# Patient Record
Sex: FEMALE | Race: ASIAN | NOT HISPANIC OR LATINO | URBAN - METROPOLITAN AREA
[De-identification: names, ages, dates, MRNs, and addresses within clinical notes are randomized per-mention and may not be internally consistent; named-entity substitution may affect disease eponyms.]

---

## 2017-01-11 ENCOUNTER — INPATIENT (INPATIENT)
Facility: HOSPITAL | Age: 22
LOS: 1 days | Discharge: HOME | DRG: 343 | End: 2017-01-13
Attending: SPECIALIST | Admitting: SPECIALIST
Payer: COMMERCIAL

## 2017-01-11 VITALS
SYSTOLIC BLOOD PRESSURE: 122 MMHG | WEIGHT: 145.06 LBS | HEIGHT: 66 IN | DIASTOLIC BLOOD PRESSURE: 74 MMHG | RESPIRATION RATE: 19 BRPM | HEART RATE: 116 BPM | TEMPERATURE: 99 F | OXYGEN SATURATION: 98 %

## 2017-01-11 DIAGNOSIS — K35.2 ACUTE APPENDICITIS WITH GENERALIZED PERITONITIS: ICD-10-CM

## 2017-01-11 DIAGNOSIS — K37 UNSPECIFIED APPENDICITIS: ICD-10-CM

## 2017-01-11 LAB
ALBUMIN SERPL ELPH-MCNC: 3.9 G/DL — SIGNIFICANT CHANGE UP (ref 3.5–5)
ALP SERPL-CCNC: 61 U/L — SIGNIFICANT CHANGE UP (ref 40–120)
ALT FLD-CCNC: 16 U/L DA — SIGNIFICANT CHANGE UP (ref 10–60)
ANION GAP SERPL CALC-SCNC: 10 MMOL/L — SIGNIFICANT CHANGE UP (ref 5–17)
APPEARANCE UR: CLEAR — SIGNIFICANT CHANGE UP
APTT BLD: 32.1 SEC — SIGNIFICANT CHANGE UP (ref 27.5–37.4)
AST SERPL-CCNC: 11 U/L — SIGNIFICANT CHANGE UP (ref 10–40)
BASOPHILS # BLD AUTO: 0 K/UL — SIGNIFICANT CHANGE UP (ref 0–0.2)
BASOPHILS NFR BLD AUTO: 0.3 % — SIGNIFICANT CHANGE UP (ref 0–2)
BILIRUB SERPL-MCNC: 0.5 MG/DL — SIGNIFICANT CHANGE UP (ref 0.2–1.2)
BILIRUB UR-MCNC: NEGATIVE — SIGNIFICANT CHANGE UP
BUN SERPL-MCNC: 8 MG/DL — SIGNIFICANT CHANGE UP (ref 7–18)
CALCIUM SERPL-MCNC: 9.4 MG/DL — SIGNIFICANT CHANGE UP (ref 8.4–10.5)
CHLORIDE SERPL-SCNC: 104 MMOL/L — SIGNIFICANT CHANGE UP (ref 96–108)
CO2 SERPL-SCNC: 25 MMOL/L — SIGNIFICANT CHANGE UP (ref 22–31)
COLOR SPEC: YELLOW — SIGNIFICANT CHANGE UP
CREAT SERPL-MCNC: 0.78 MG/DL — SIGNIFICANT CHANGE UP (ref 0.5–1.3)
DIFF PNL FLD: ABNORMAL
EOSINOPHIL # BLD AUTO: 0 K/UL — SIGNIFICANT CHANGE UP (ref 0–0.5)
EOSINOPHIL NFR BLD AUTO: 0 % — SIGNIFICANT CHANGE UP (ref 0–6)
GLUCOSE SERPL-MCNC: 127 MG/DL — HIGH (ref 70–99)
GLUCOSE UR QL: NEGATIVE — SIGNIFICANT CHANGE UP
HCG UR QL: NEGATIVE — SIGNIFICANT CHANGE UP
HCT VFR BLD CALC: 42 % — SIGNIFICANT CHANGE UP (ref 34.5–45)
HGB BLD-MCNC: 14.1 G/DL — SIGNIFICANT CHANGE UP (ref 11.5–15.5)
INR BLD: 1.19 RATIO — HIGH (ref 0.88–1.16)
KETONES UR-MCNC: ABNORMAL
LACTATE SERPL-SCNC: 0.9 MMOL/L — SIGNIFICANT CHANGE UP (ref 0.7–2)
LEUKOCYTE ESTERASE UR-ACNC: ABNORMAL
LIDOCAIN IGE QN: 103 U/L — SIGNIFICANT CHANGE UP (ref 73–393)
LYMPHOCYTES # BLD AUTO: 0.9 K/UL — LOW (ref 1–3.3)
LYMPHOCYTES # BLD AUTO: 6.1 % — LOW (ref 13–44)
MCHC RBC-ENTMCNC: 28.2 PG — SIGNIFICANT CHANGE UP (ref 27–34)
MCHC RBC-ENTMCNC: 33.5 GM/DL — SIGNIFICANT CHANGE UP (ref 32–36)
MCV RBC AUTO: 84.2 FL — SIGNIFICANT CHANGE UP (ref 80–100)
MONOCYTES # BLD AUTO: 0.3 K/UL — SIGNIFICANT CHANGE UP (ref 0–0.9)
MONOCYTES NFR BLD AUTO: 2 % — SIGNIFICANT CHANGE UP (ref 2–14)
NEUTROPHILS # BLD AUTO: 14.2 K/UL — HIGH (ref 1.8–7.4)
NEUTROPHILS NFR BLD AUTO: 91.6 % — HIGH (ref 43–77)
NITRITE UR-MCNC: NEGATIVE — SIGNIFICANT CHANGE UP
PH UR: 6 — SIGNIFICANT CHANGE UP (ref 4.8–8)
PLATELET # BLD AUTO: 223 K/UL — SIGNIFICANT CHANGE UP (ref 150–400)
POTASSIUM SERPL-MCNC: 3.6 MMOL/L — SIGNIFICANT CHANGE UP (ref 3.5–5.3)
POTASSIUM SERPL-SCNC: 3.6 MMOL/L — SIGNIFICANT CHANGE UP (ref 3.5–5.3)
PROT SERPL-MCNC: 8.6 G/DL — HIGH (ref 6–8.3)
PROT UR-MCNC: 15
PROTHROM AB SERPL-ACNC: 13.4 SEC — HIGH (ref 10–13.1)
RBC # BLD: 4.99 M/UL — SIGNIFICANT CHANGE UP (ref 3.8–5.2)
RBC # FLD: 12 % — SIGNIFICANT CHANGE UP (ref 10.3–14.5)
SODIUM SERPL-SCNC: 139 MMOL/L — SIGNIFICANT CHANGE UP (ref 135–145)
SP GR SPEC: 1.03 — HIGH (ref 1.01–1.02)
UROBILINOGEN FLD QL: NEGATIVE — SIGNIFICANT CHANGE UP
WBC # BLD: 15.5 K/UL — HIGH (ref 3.8–10.5)
WBC # FLD AUTO: 15.5 K/UL — HIGH (ref 3.8–10.5)

## 2017-01-11 PROCEDURE — 99285 EMERGENCY DEPT VISIT HI MDM: CPT | Mod: 25

## 2017-01-11 PROCEDURE — 88304 TISSUE EXAM BY PATHOLOGIST: CPT | Mod: 26

## 2017-01-11 PROCEDURE — 74177 CT ABD & PELVIS W/CONTRAST: CPT | Mod: 26

## 2017-01-11 PROCEDURE — 44970 LAPAROSCOPY APPENDECTOMY: CPT | Mod: AS

## 2017-01-11 RX ORDER — SODIUM CHLORIDE 9 MG/ML
1000 INJECTION INTRAMUSCULAR; INTRAVENOUS; SUBCUTANEOUS ONCE
Qty: 0 | Refills: 0 | Status: COMPLETED | OUTPATIENT
Start: 2017-01-11 | End: 2017-01-11

## 2017-01-11 RX ORDER — SODIUM CHLORIDE 9 MG/ML
1000 INJECTION, SOLUTION INTRAVENOUS
Qty: 0 | Refills: 0 | Status: DISCONTINUED | OUTPATIENT
Start: 2017-01-11 | End: 2017-01-11

## 2017-01-11 RX ORDER — MORPHINE SULFATE 50 MG/1
4 CAPSULE, EXTENDED RELEASE ORAL EVERY 4 HOURS
Qty: 0 | Refills: 0 | Status: DISCONTINUED | OUTPATIENT
Start: 2017-01-11 | End: 2017-01-13

## 2017-01-11 RX ORDER — METOCLOPRAMIDE HCL 10 MG
10 TABLET ORAL ONCE
Qty: 0 | Refills: 0 | Status: COMPLETED | OUTPATIENT
Start: 2017-01-11 | End: 2017-01-11

## 2017-01-11 RX ORDER — ENOXAPARIN SODIUM 100 MG/ML
40 INJECTION SUBCUTANEOUS EVERY 24 HOURS
Qty: 0 | Refills: 0 | Status: DISCONTINUED | OUTPATIENT
Start: 2017-01-11 | End: 2017-01-13

## 2017-01-11 RX ORDER — HYDROMORPHONE HYDROCHLORIDE 2 MG/ML
0.5 INJECTION INTRAMUSCULAR; INTRAVENOUS; SUBCUTANEOUS
Qty: 0 | Refills: 0 | Status: DISCONTINUED | OUTPATIENT
Start: 2017-01-11 | End: 2017-01-11

## 2017-01-11 RX ORDER — ONDANSETRON 8 MG/1
4 TABLET, FILM COATED ORAL EVERY 6 HOURS
Qty: 0 | Refills: 0 | Status: DISCONTINUED | OUTPATIENT
Start: 2017-01-11 | End: 2017-01-11

## 2017-01-11 RX ORDER — ENOXAPARIN SODIUM 100 MG/ML
40 INJECTION SUBCUTANEOUS EVERY 24 HOURS
Qty: 0 | Refills: 0 | Status: DISCONTINUED | OUTPATIENT
Start: 2017-01-11 | End: 2017-01-11

## 2017-01-11 RX ORDER — ONDANSETRON 8 MG/1
4 TABLET, FILM COATED ORAL ONCE
Qty: 0 | Refills: 0 | Status: COMPLETED | OUTPATIENT
Start: 2017-01-11 | End: 2017-01-11

## 2017-01-11 RX ORDER — ACETAMINOPHEN WITH CODEINE 300MG-30MG
1 TABLET ORAL EVERY 4 HOURS
Qty: 0 | Refills: 0 | Status: DISCONTINUED | OUTPATIENT
Start: 2017-01-11 | End: 2017-01-13

## 2017-01-11 RX ORDER — METOCLOPRAMIDE HCL 10 MG
1 TABLET ORAL
Qty: 9 | Refills: 0 | OUTPATIENT
Start: 2017-01-11 | End: 2017-01-14

## 2017-01-11 RX ORDER — KETOROLAC TROMETHAMINE 30 MG/ML
30 SYRINGE (ML) INJECTION ONCE
Qty: 0 | Refills: 0 | Status: DISCONTINUED | OUTPATIENT
Start: 2017-01-11 | End: 2017-01-11

## 2017-01-11 RX ORDER — MORPHINE SULFATE 50 MG/1
2 CAPSULE, EXTENDED RELEASE ORAL EVERY 4 HOURS
Qty: 0 | Refills: 0 | Status: DISCONTINUED | OUTPATIENT
Start: 2017-01-11 | End: 2017-01-11

## 2017-01-11 RX ORDER — SODIUM CHLORIDE 9 MG/ML
1000 INJECTION, SOLUTION INTRAVENOUS
Qty: 0 | Refills: 0 | Status: DISCONTINUED | OUTPATIENT
Start: 2017-01-11 | End: 2017-01-13

## 2017-01-11 RX ORDER — CEFOTETAN DISODIUM 1 G
2 VIAL (EA) INJECTION ONCE
Qty: 0 | Refills: 0 | Status: COMPLETED | OUTPATIENT
Start: 2017-01-11 | End: 2017-01-11

## 2017-01-11 RX ORDER — ACETAMINOPHEN 500 MG
650 TABLET ORAL EVERY 6 HOURS
Qty: 0 | Refills: 0 | Status: DISCONTINUED | OUTPATIENT
Start: 2017-01-11 | End: 2017-01-11

## 2017-01-11 RX ORDER — ONDANSETRON 8 MG/1
4 TABLET, FILM COATED ORAL ONCE
Qty: 0 | Refills: 0 | Status: COMPLETED | OUTPATIENT
Start: 2017-01-11 | End: 2017-01-12

## 2017-01-11 RX ADMIN — HYDROMORPHONE HYDROCHLORIDE 0.5 MILLIGRAM(S): 2 INJECTION INTRAMUSCULAR; INTRAVENOUS; SUBCUTANEOUS at 11:53

## 2017-01-11 RX ADMIN — Medication 100 GRAM(S): at 08:04

## 2017-01-11 RX ADMIN — SODIUM CHLORIDE 100 MILLILITER(S): 9 INJECTION, SOLUTION INTRAVENOUS at 19:22

## 2017-01-11 RX ADMIN — Medication 1 TABLET(S): at 18:00

## 2017-01-11 RX ADMIN — SODIUM CHLORIDE 1000 MILLILITER(S): 9 INJECTION INTRAMUSCULAR; INTRAVENOUS; SUBCUTANEOUS at 08:04

## 2017-01-11 RX ADMIN — Medication 30 MILLIGRAM(S): at 06:05

## 2017-01-11 RX ADMIN — SODIUM CHLORIDE 1000 MILLILITER(S): 9 INJECTION INTRAMUSCULAR; INTRAVENOUS; SUBCUTANEOUS at 04:53

## 2017-01-11 RX ADMIN — SODIUM CHLORIDE 100 MILLILITER(S): 9 INJECTION, SOLUTION INTRAVENOUS at 13:10

## 2017-01-11 RX ADMIN — HYDROMORPHONE HYDROCHLORIDE 0.5 MILLIGRAM(S): 2 INJECTION INTRAMUSCULAR; INTRAVENOUS; SUBCUTANEOUS at 13:10

## 2017-01-11 RX ADMIN — Medication 1 TABLET(S): at 17:20

## 2017-01-11 RX ADMIN — ONDANSETRON 4 MILLIGRAM(S): 8 TABLET, FILM COATED ORAL at 04:46

## 2017-01-11 RX ADMIN — ENOXAPARIN SODIUM 40 MILLIGRAM(S): 100 INJECTION SUBCUTANEOUS at 17:21

## 2017-01-11 RX ADMIN — Medication 30 MILLIGRAM(S): at 04:46

## 2017-01-11 RX ADMIN — Medication 10 MILLIGRAM(S): at 04:53

## 2017-01-11 NOTE — H&P ADULT. - HISTORY OF PRESENT ILLNESS
21 y.o. F with no significant PMH presents to ER c/o abd pain for 2 days. Pt woke up Monday morning to periumbilical pain, sharp, constant, nonradiating. Now pain is periumbilical and also RLQ, mildly relieved with Tylenol. Assoc with nausea and nonbilious, nonbloody vomiting, chills, and loose stool. Denies fever, CP, SOB, dysuria, hematuria. Pt is on birth control but has not had a GYN consultation. Last meal last night.

## 2017-01-11 NOTE — ED PROVIDER NOTE - MEDICAL DECISION MAKING DETAILS
20 y/o F pt w/ abd pain. Will do labs, give fluids, Reglan, and Toradol, get CT abd to assess for acute pathology and reassess. 22 y/o F pt w/ abd pain. Will do labs, give fluids, Reglan, and Toradol, get CT abd to assess for acute pathology and reassess. Pt given

## 2017-01-11 NOTE — H&P ADULT. - PROBLEM SELECTOR PLAN 1
1. Admit to surgery under Dr. Stephens  2. to OR today for laparoscopic appendectomy  3. NPO  4. IVF  5. Pain control prn  6. Zofran prn nausea  7. Pt consented

## 2017-01-11 NOTE — ED PROVIDER NOTE - OBJECTIVE STATEMENT
22 y/o F pt w/ no significant PMHx presents to the ED c/o N/V (nonbilious, nonbloody) x2 days. Pt also notes periumbilical pain that radiates to her lower abd and R abd. Pt denies fever, diarrhea, or any other complaints. Pt states that she saw her PMD who gave her Zofran to no relief; pt has 2 episodes of vomiting after seeing PMD and decided to report to ED. NKDA.

## 2017-01-11 NOTE — ED PROVIDER NOTE - NS ED MD SCRIBE ATTENDING SCRIBE SECTIONS
HIV/DISPOSITION/PHYSICAL EXAM/HISTORY OF PRESENT ILLNESS/PAST MEDICAL/SURGICAL/SOCIAL HISTORY/REVIEW OF SYSTEMS/VITAL SIGNS( Pullset)

## 2017-01-12 LAB
BASOPHILS # BLD AUTO: 0 K/UL — SIGNIFICANT CHANGE UP (ref 0–0.2)
BASOPHILS NFR BLD AUTO: 0.6 % — SIGNIFICANT CHANGE UP (ref 0–2)
EOSINOPHIL # BLD AUTO: 0 K/UL — SIGNIFICANT CHANGE UP (ref 0–0.5)
EOSINOPHIL NFR BLD AUTO: 0.6 % — SIGNIFICANT CHANGE UP (ref 0–6)
HCT VFR BLD CALC: 31.7 % — LOW (ref 34.5–45)
HGB BLD-MCNC: 10.8 G/DL — LOW (ref 11.5–15.5)
LYMPHOCYTES # BLD AUTO: 1 K/UL — SIGNIFICANT CHANGE UP (ref 1–3.3)
LYMPHOCYTES # BLD AUTO: 14.5 % — SIGNIFICANT CHANGE UP (ref 13–44)
MCHC RBC-ENTMCNC: 29.4 PG — SIGNIFICANT CHANGE UP (ref 27–34)
MCHC RBC-ENTMCNC: 34.2 GM/DL — SIGNIFICANT CHANGE UP (ref 32–36)
MCV RBC AUTO: 86 FL — SIGNIFICANT CHANGE UP (ref 80–100)
MONOCYTES # BLD AUTO: 0.3 K/UL — SIGNIFICANT CHANGE UP (ref 0–0.9)
MONOCYTES NFR BLD AUTO: 5.1 % — SIGNIFICANT CHANGE UP (ref 2–14)
NEUTROPHILS # BLD AUTO: 5.4 K/UL — SIGNIFICANT CHANGE UP (ref 1.8–7.4)
NEUTROPHILS NFR BLD AUTO: 79.2 % — HIGH (ref 43–77)
PLATELET # BLD AUTO: 155 K/UL — SIGNIFICANT CHANGE UP (ref 150–400)
RBC # BLD: 3.69 M/UL — LOW (ref 3.8–5.2)
RBC # FLD: 12 % — SIGNIFICANT CHANGE UP (ref 10.3–14.5)
WBC # BLD: 6.9 K/UL — SIGNIFICANT CHANGE UP (ref 3.8–10.5)
WBC # FLD AUTO: 6.9 K/UL — SIGNIFICANT CHANGE UP (ref 3.8–10.5)

## 2017-01-12 RX ORDER — CEFOTETAN DISODIUM 1 G
VIAL (EA) INJECTION
Qty: 0 | Refills: 0 | Status: DISCONTINUED | OUTPATIENT
Start: 2017-01-12 | End: 2017-01-13

## 2017-01-12 RX ORDER — CEFOTETAN DISODIUM 1 G
1 VIAL (EA) INJECTION EVERY 12 HOURS
Qty: 0 | Refills: 0 | Status: DISCONTINUED | OUTPATIENT
Start: 2017-01-13 | End: 2017-01-13

## 2017-01-12 RX ORDER — CEFOTETAN DISODIUM 1 G
1 VIAL (EA) INJECTION ONCE
Qty: 0 | Refills: 0 | Status: COMPLETED | OUTPATIENT
Start: 2017-01-12 | End: 2017-01-12

## 2017-01-12 RX ORDER — ACETAMINOPHEN 500 MG
650 TABLET ORAL EVERY 6 HOURS
Qty: 0 | Refills: 0 | Status: DISCONTINUED | OUTPATIENT
Start: 2017-01-12 | End: 2017-01-13

## 2017-01-12 RX ADMIN — MORPHINE SULFATE 4 MILLIGRAM(S): 50 CAPSULE, EXTENDED RELEASE ORAL at 17:15

## 2017-01-12 RX ADMIN — MORPHINE SULFATE 4 MILLIGRAM(S): 50 CAPSULE, EXTENDED RELEASE ORAL at 16:21

## 2017-01-12 RX ADMIN — Medication 1 TABLET(S): at 22:49

## 2017-01-12 RX ADMIN — Medication 1 TABLET(S): at 23:20

## 2017-01-12 RX ADMIN — ONDANSETRON 4 MILLIGRAM(S): 8 TABLET, FILM COATED ORAL at 11:11

## 2017-01-12 RX ADMIN — Medication 1 TABLET(S): at 15:14

## 2017-01-12 RX ADMIN — Medication 1 TABLET(S): at 15:45

## 2017-01-12 RX ADMIN — Medication 1 TABLET(S): at 06:02

## 2017-01-12 RX ADMIN — Medication 650 MILLIGRAM(S): at 16:21

## 2017-01-12 RX ADMIN — SODIUM CHLORIDE 100 MILLILITER(S): 9 INJECTION, SOLUTION INTRAVENOUS at 12:54

## 2017-01-12 RX ADMIN — Medication 650 MILLIGRAM(S): at 01:00

## 2017-01-12 RX ADMIN — Medication 120 GRAM(S): at 17:39

## 2017-01-12 RX ADMIN — ENOXAPARIN SODIUM 40 MILLIGRAM(S): 100 INJECTION SUBCUTANEOUS at 17:39

## 2017-01-12 RX ADMIN — Medication 1 TABLET(S): at 05:32

## 2017-01-13 VITALS
OXYGEN SATURATION: 99 % | SYSTOLIC BLOOD PRESSURE: 109 MMHG | DIASTOLIC BLOOD PRESSURE: 64 MMHG | TEMPERATURE: 99 F | RESPIRATION RATE: 16 BRPM | HEART RATE: 95 BPM

## 2017-01-13 LAB
HCT VFR BLD CALC: 31.7 % — LOW (ref 34.5–45)
HGB BLD-MCNC: 10.5 G/DL — LOW (ref 11.5–15.5)
MCHC RBC-ENTMCNC: 28.6 PG — SIGNIFICANT CHANGE UP (ref 27–34)
MCHC RBC-ENTMCNC: 33.2 GM/DL — SIGNIFICANT CHANGE UP (ref 32–36)
MCV RBC AUTO: 86.2 FL — SIGNIFICANT CHANGE UP (ref 80–100)
PLATELET # BLD AUTO: 175 K/UL — SIGNIFICANT CHANGE UP (ref 150–400)
RBC # BLD: 3.68 M/UL — LOW (ref 3.8–5.2)
RBC # FLD: 11.8 % — SIGNIFICANT CHANGE UP (ref 10.3–14.5)
SURGICAL PATHOLOGY FINAL REPORT - CH: SIGNIFICANT CHANGE UP
WBC # BLD: 6.1 K/UL — SIGNIFICANT CHANGE UP (ref 3.8–10.5)
WBC # FLD AUTO: 6.1 K/UL — SIGNIFICANT CHANGE UP (ref 3.8–10.5)

## 2017-01-13 PROCEDURE — 83690 ASSAY OF LIPASE: CPT

## 2017-01-13 PROCEDURE — 85610 PROTHROMBIN TIME: CPT

## 2017-01-13 PROCEDURE — 85027 COMPLETE CBC AUTOMATED: CPT

## 2017-01-13 PROCEDURE — 86900 BLOOD TYPING SEROLOGIC ABO: CPT

## 2017-01-13 PROCEDURE — 96374 THER/PROPH/DIAG INJ IV PUSH: CPT | Mod: 59

## 2017-01-13 PROCEDURE — 96375 TX/PRO/DX INJ NEW DRUG ADDON: CPT

## 2017-01-13 PROCEDURE — 80053 COMPREHEN METABOLIC PANEL: CPT

## 2017-01-13 PROCEDURE — 74177 CT ABD & PELVIS W/CONTRAST: CPT

## 2017-01-13 PROCEDURE — 83605 ASSAY OF LACTIC ACID: CPT

## 2017-01-13 PROCEDURE — 81025 URINE PREGNANCY TEST: CPT

## 2017-01-13 PROCEDURE — 85730 THROMBOPLASTIN TIME PARTIAL: CPT

## 2017-01-13 PROCEDURE — 99285 EMERGENCY DEPT VISIT HI MDM: CPT | Mod: 25

## 2017-01-13 PROCEDURE — 88304 TISSUE EXAM BY PATHOLOGIST: CPT

## 2017-01-13 PROCEDURE — 86850 RBC ANTIBODY SCREEN: CPT

## 2017-01-13 PROCEDURE — 81001 URINALYSIS AUTO W/SCOPE: CPT

## 2017-01-13 PROCEDURE — 86901 BLOOD TYPING SEROLOGIC RH(D): CPT

## 2017-01-13 RX ORDER — ACETAMINOPHEN WITH CODEINE 300MG-30MG
1 TABLET ORAL
Qty: 20 | Refills: 0 | OUTPATIENT
Start: 2017-01-13 | End: 2017-01-18

## 2017-01-13 RX ADMIN — Medication 120 GRAM(S): at 05:43

## 2017-01-13 NOTE — DISCHARGE NOTE ADULT - OTHER SIGNIFICANT FINDINGS
Patient ID: [id number] Patient Name: RYAN PECK   YOB: 1995 Sex: F      EXAM: CT ABDOMEN AND PELVIS IC      PROCEDURE DATE: 01/11/2017      INTERPRETATION: CLINICAL INDICATION: Right lower quadrant abdominal pain.  Vomiting.    COMPARISON: None.    TECHNIQUE: Axial CT of the abdomen and pelvis was performed after the  administration of oral and intravenous contrast. Coronal and sagittal  reformatted images were submitted.    Total of 94 cc of Omnipaque 350 was injected intravenously without immediate  complication.    FINDINGS:    LUNGS: Bibasilar dependent changes. No pleural effusion.    LIVER/GALLBLADDER: Normal in size with homogenous enhancement. No  intrahepatic or extrahepatic biliary ductal dilatation.    The gallbladder is unremarkable without calcified gallstones, wall  thickening, or pericholecystic fluid.    PANCREAS: No pancreatic ductal dilatation, peripancreatic fluid collection,  or focal pancreatic mass.    SPLEEN: Normal in size and enhancement.    KIDNEYS: Symmetric in size and enhancement without evidence of  hydronephrosis. No perinephric stranding or fluid collection.    ADRENAL GLANDS: Unremarkable.    BOWEL: The appendix is top normal in size measuring 6 to 7 mm in diameter  (series 2, image 91; series 602B image 48). No significant surrounding  inflammatory change is noted.    Multiple fluid-filled loops of small with differential air-fluid levels in  the pelvis. There is suggestion of a possible transition point versus focal  soft tissue thickening in the left lower quadrant of the abdomen (series 2,  images 82-83; coronal image 37). Mild mesenteric edema in the pelvis. The  descending colon and sigmoid are relatively collapsed.    URINARY BLADDER: Unremarkable.    GYN: Unremarkable.    BONES/SOFT TISSUES: Unremarkable.    AORTA/MAJOR BRANCHES: Unremarkable.    IMPRESSION:    Top normal fluid-filled appendix likely reflects changes from early  appendicitis.    Multiple fluid-filled loops of small bowel due to may reflect partial small  bowel obstruction versus paralytic ileus. Mild mesenteric edema with  increased vascularity in the pelvis associated with the dilated small bowel.  Correlate with lactate level.    Findings were discussed with Dr. Vinson at 7:20 AM on 1/11/2017 with read  back.              SANTHOSH WADSWORTH M.D., ATTENDING RADIOLOGIST  This document has been electronically signed. Jan 11 2017 7:34AM

## 2017-01-13 NOTE — DISCHARGE NOTE ADULT - MEDICATION SUMMARY - MEDICATIONS TO TAKE
I will START or STAY ON the medications listed below when I get home from the hospital:    acetaminophen-codeine 300 mg-30 mg oral tablet  -- 1 tab(s) by mouth every 6 hours, As Needed, Moderate Pain (4 - 6) MDD:4  -- Indication: For prn pain    Reglan 10 mg oral tablet  -- 1 tab(s) by mouth 3 times a day (before meals) as needed for nausea or vomiting  -- It is very important that you take or use this exactly as directed.  Do not skip doses or discontinue unless directed by your doctor.  May cause drowsiness.  Alcohol may intensify this effect.  Use care when operating dangerous machinery.  Take medication on an empty stomach 1 hour before or 2 to 3 hours after a meal unless otherwise directed by your doctor.    -- Indication: For  nausea     Augmentin 875 mg-125 mg oral tablet  -- 1 tab(s) by mouth every 12 hours  -- Finish all this medication unless otherwise directed by prescriber.  Take with food or milk.    -- Indication: For Acute appendicitis with generalized peritonitis I will START or STAY ON the medications listed below when I get home from the hospital:    Percocet 5/325 325 mg-5 mg oral tablet  -- 1 tab(s) by mouth every 6 hours MDD:4  -- Caution federal law prohibits the transfer of this drug to any person other  than the person for whom it was prescribed.  May cause drowsiness.  Alcohol may intensify this effect.  Use care when operating dangerous machinery.  This prescription cannot be refilled.  This product contains acetaminophen.  Do not use  with any other product containing acetaminophen to prevent possible liver damage.  Using more of this medication than prescribed may cause serious breathing problems.    -- Indication: For prn pain     Reglan 10 mg oral tablet  -- 1 tab(s) by mouth 3 times a day (before meals) as needed for nausea or vomiting  -- It is very important that you take or use this exactly as directed.  Do not skip doses or discontinue unless directed by your doctor.  May cause drowsiness.  Alcohol may intensify this effect.  Use care when operating dangerous machinery.  Take medication on an empty stomach 1 hour before or 2 to 3 hours after a meal unless otherwise directed by your doctor.    -- Indication: For  nausea     Augmentin 875 mg-125 mg oral tablet  -- 1 tab(s) by mouth every 12 hours  -- Finish all this medication unless otherwise directed by prescriber.  Take with food or milk.    -- Indication: For Acute appendicitis with generalized peritonitis

## 2017-01-13 NOTE — DISCHARGE NOTE ADULT - PATIENT PORTAL LINK FT
“You can access the Follow[employer] Patient Portal, offered by [facility], by registering with the following website: [email or web address]”

## 2017-01-13 NOTE — DISCHARGE NOTE ADULT - HOSPITAL COURSE
21 y.o. F with no significant PMH presents to ER c/o abd pain for 2 days. Pt woke up Monday morning to periumbilical pain, sharp, constant, nonradiating. Now pain is periumbilical and also RLQ, mildly relieved with Tylenol. Assoc with nausea and nonbilious, nonbloody vomiting, chills, and loose stool. Denies fever, CP, SOB, dysuria, hematuria. Pt is on birth control but has not had a GYN consultation. Last meal last night.    Patient was admitted and taken to the OR. Patient underwent laparoscopic appendectomy on 1/11. Patient tolerated procedure well. Patient tolerated diet. Patient for discharge home with oral antibiotics. Patient recommended to follow up with Dr. Stephens within 1 week.

## 2017-01-13 NOTE — DISCHARGE NOTE ADULT - CARE PLAN
Principal Discharge DX:	Acute appendicitis with generalized peritonitis  Goal:	wound healing  Instructions for follow-up, activity and diet:	Please follow up with Dr. Stephens within 1 week   No heavy lifting or straining   Diet as tolerated

## 2017-01-13 NOTE — DISCHARGE NOTE ADULT - PLAN OF CARE
wound healing Please follow up with Dr. Stephens within 1 week   No heavy lifting or straining   Diet as tolerated

## 2017-01-13 NOTE — DISCHARGE NOTE ADULT - CONDITIONS AT DISCHARGE
Pt received AOx3 breathing unlabored no c/o resp. distress chest pain or SOB. pt S/P lap Appy Abdomen soft tender to touch non distended BS present in all 4 quadrants Pt tolerating diet denies any N/V. Cap refill less than 3 seconds pulses present in all extremities pt with positive sensation and mobility no neurovascular deficit noted. Pt OOB ambulating voiding without any difficulties. Pt for DC home verbalizes understanding and agreement with DC. Vital signs stable no distress noted.

## 2017-01-13 NOTE — DISCHARGE NOTE ADULT - CARE PROVIDERS DIRECT ADDRESSES
[email or web address]

## 2017-01-13 NOTE — DISCHARGE NOTE ADULT - CARE PROVIDER_API CALL
Yash Stephens), Surgery  [id number] [address] [place] [address]  Phone: [phone]  Fax: [phone]

## 2017-01-17 DIAGNOSIS — K35.80 UNSPECIFIED ACUTE APPENDICITIS: ICD-10-CM

## 2017-01-17 DIAGNOSIS — R50.82 POSTPROCEDURAL FEVER: ICD-10-CM

## 2017-01-17 DIAGNOSIS — R10.9 UNSPECIFIED ABDOMINAL PAIN: ICD-10-CM

## 2017-01-17 PROBLEM — Z00.00 ENCOUNTER FOR PREVENTIVE HEALTH EXAMINATION: Status: ACTIVE | Noted: 2017-01-17

## 2017-01-19 ENCOUNTER — APPOINTMENT (OUTPATIENT)
Dept: SURGERY | Facility: CLINIC | Age: 22
End: 2017-01-19

## 2017-05-28 ENCOUNTER — EMERGENCY (EMERGENCY)
Facility: HOSPITAL | Age: 22
LOS: 1 days | Discharge: HOME | End: 2017-05-28
Attending: EMERGENCY MEDICINE
Payer: COMMERCIAL

## 2017-05-28 VITALS
OXYGEN SATURATION: 100 % | DIASTOLIC BLOOD PRESSURE: 60 MMHG | TEMPERATURE: 103 F | WEIGHT: 154.98 LBS | SYSTOLIC BLOOD PRESSURE: 106 MMHG | HEIGHT: 66 IN | RESPIRATION RATE: 22 BRPM | HEART RATE: 119 BPM

## 2017-05-28 DIAGNOSIS — R51 HEADACHE: ICD-10-CM

## 2017-05-28 DIAGNOSIS — J03.90 ACUTE TONSILLITIS, UNSPECIFIED: ICD-10-CM

## 2017-05-28 PROCEDURE — 96375 TX/PRO/DX INJ NEW DRUG ADDON: CPT

## 2017-05-28 PROCEDURE — 99284 EMERGENCY DEPT VISIT MOD MDM: CPT

## 2017-05-28 PROCEDURE — 96374 THER/PROPH/DIAG INJ IV PUSH: CPT

## 2017-05-28 PROCEDURE — 99284 EMERGENCY DEPT VISIT MOD MDM: CPT | Mod: 25

## 2017-05-28 RX ORDER — ACETAMINOPHEN 500 MG
975 TABLET ORAL ONCE
Qty: 0 | Refills: 0 | Status: COMPLETED | OUTPATIENT
Start: 2017-05-28 | End: 2017-05-28

## 2017-05-28 RX ORDER — AMPICILLIN SODIUM AND SULBACTAM SODIUM 250; 125 MG/ML; MG/ML
3 INJECTION, POWDER, FOR SUSPENSION INTRAMUSCULAR; INTRAVENOUS ONCE
Qty: 0 | Refills: 0 | Status: COMPLETED | OUTPATIENT
Start: 2017-05-28 | End: 2017-05-28

## 2017-05-28 RX ORDER — SODIUM CHLORIDE 9 MG/ML
1000 INJECTION INTRAMUSCULAR; INTRAVENOUS; SUBCUTANEOUS ONCE
Qty: 0 | Refills: 0 | Status: COMPLETED | OUTPATIENT
Start: 2017-05-28 | End: 2017-05-28

## 2017-05-28 RX ORDER — IBUPROFEN 200 MG
1 TABLET ORAL
Qty: 30 | Refills: 0 | OUTPATIENT
Start: 2017-05-28 | End: 2017-06-07

## 2017-05-28 RX ORDER — KETOROLAC TROMETHAMINE 30 MG/ML
30 SYRINGE (ML) INJECTION ONCE
Qty: 0 | Refills: 0 | Status: DISCONTINUED | OUTPATIENT
Start: 2017-05-28 | End: 2017-05-28

## 2017-05-28 RX ADMIN — AMPICILLIN SODIUM AND SULBACTAM SODIUM 200 GRAM(S): 250; 125 INJECTION, POWDER, FOR SUSPENSION INTRAMUSCULAR; INTRAVENOUS at 17:59

## 2017-05-28 RX ADMIN — Medication 30 MILLIGRAM(S): at 19:14

## 2017-05-28 RX ADMIN — Medication 30 MILLIGRAM(S): at 17:59

## 2017-05-28 RX ADMIN — Medication 975 MILLIGRAM(S): at 17:58

## 2017-05-28 RX ADMIN — SODIUM CHLORIDE 1000 MILLILITER(S): 9 INJECTION INTRAMUSCULAR; INTRAVENOUS; SUBCUTANEOUS at 17:58

## 2017-05-28 NOTE — ED PROVIDER NOTE - THROAT FINDINGS
injected tonsils with exudate NO TONGUE ELEVATION/NO DROOLING/NO STRIDOR/injected tonsils with exudate

## 2017-05-28 NOTE — ED PROVIDER NOTE - PROGRESS NOTE DETAILS
Patient improved, well appearing, tolerating po. Will dc to follow up with ENT and pmd. Precautions reviewed.

## 2017-05-28 NOTE — ED PROVIDER NOTE - NS ED MD SCRIBE ATTENDING SCRIBE SECTIONS
REVIEW OF SYSTEMS/PHYSICAL EXAM/HIV/PAST MEDICAL/SURGICAL/SOCIAL HISTORY/HISTORY OF PRESENT ILLNESS/DISPOSITION/VITAL SIGNS( Pullset)

## 2017-05-28 NOTE — ED PROVIDER NOTE - OBJECTIVE STATEMENT
20 y/o F with no significant PMHx presents to ED c/o constant fever, T-Max: 103F, sore throat, headache, ringing in ears and difficulty swallowing 2/2 throat pain x 3 days. Pt denies any sick contacts. She has been taking Tylenol with temporary relief of fever. Pt denies any vomiting, diarrhea, abd pain, cough, or any other complaints. NKDA.

## 2017-05-28 NOTE — ED PROVIDER NOTE - CONSTITUTIONAL, MLM
normal... Febrile, well nourished, awake, alert, oriented to person, place, time/situation and in no apparent distress.

## 2017-05-28 NOTE — ED ADULT NURSE NOTE - OBJECTIVE STATEMENT
Pt a+ox3 w/ c/o sore throat, fever (highest 102), nausea x 3 days, pt denies SOB, chest pain, diarrhea, constipation, 6/10 pain scale.

## 2018-01-04 ENCOUNTER — EMERGENCY (EMERGENCY)
Facility: HOSPITAL | Age: 23
LOS: 1 days | End: 2018-01-04
Attending: EMERGENCY MEDICINE
Payer: COMMERCIAL

## 2018-01-04 VITALS
WEIGHT: 147.93 LBS | SYSTOLIC BLOOD PRESSURE: 122 MMHG | HEIGHT: 66 IN | OXYGEN SATURATION: 100 % | HEART RATE: 78 BPM | TEMPERATURE: 98 F | DIASTOLIC BLOOD PRESSURE: 74 MMHG | RESPIRATION RATE: 18 BRPM

## 2018-01-04 LAB
ALBUMIN SERPL ELPH-MCNC: 3.5 G/DL — SIGNIFICANT CHANGE UP (ref 3.5–5)
ALP SERPL-CCNC: 61 U/L — SIGNIFICANT CHANGE UP (ref 40–120)
ALT FLD-CCNC: 14 U/L DA — SIGNIFICANT CHANGE UP (ref 10–60)
ANION GAP SERPL CALC-SCNC: 7 MMOL/L — SIGNIFICANT CHANGE UP (ref 5–17)
APPEARANCE UR: CLEAR — SIGNIFICANT CHANGE UP
AST SERPL-CCNC: 13 U/L — SIGNIFICANT CHANGE UP (ref 10–40)
BASOPHILS # BLD AUTO: 0 K/UL — SIGNIFICANT CHANGE UP (ref 0–0.2)
BASOPHILS NFR BLD AUTO: 0.7 % — SIGNIFICANT CHANGE UP (ref 0–2)
BILIRUB SERPL-MCNC: 0.2 MG/DL — SIGNIFICANT CHANGE UP (ref 0.2–1.2)
BILIRUB UR-MCNC: NEGATIVE — SIGNIFICANT CHANGE UP
BUN SERPL-MCNC: 12 MG/DL — SIGNIFICANT CHANGE UP (ref 7–18)
CALCIUM SERPL-MCNC: 8.9 MG/DL — SIGNIFICANT CHANGE UP (ref 8.4–10.5)
CHLORIDE SERPL-SCNC: 105 MMOL/L — SIGNIFICANT CHANGE UP (ref 96–108)
CO2 SERPL-SCNC: 26 MMOL/L — SIGNIFICANT CHANGE UP (ref 22–31)
COLOR SPEC: YELLOW — SIGNIFICANT CHANGE UP
CREAT SERPL-MCNC: 0.63 MG/DL — SIGNIFICANT CHANGE UP (ref 0.5–1.3)
DIFF PNL FLD: ABNORMAL
EOSINOPHIL # BLD AUTO: 0.1 K/UL — SIGNIFICANT CHANGE UP (ref 0–0.5)
EOSINOPHIL NFR BLD AUTO: 1 % — SIGNIFICANT CHANGE UP (ref 0–6)
GLUCOSE SERPL-MCNC: 82 MG/DL — SIGNIFICANT CHANGE UP (ref 70–99)
GLUCOSE UR QL: NEGATIVE — SIGNIFICANT CHANGE UP
HCG UR QL: NEGATIVE — SIGNIFICANT CHANGE UP
HCT VFR BLD CALC: 40.6 % — SIGNIFICANT CHANGE UP (ref 34.5–45)
HGB BLD-MCNC: 13 G/DL — SIGNIFICANT CHANGE UP (ref 11.5–15.5)
KETONES UR-MCNC: NEGATIVE — SIGNIFICANT CHANGE UP
LEUKOCYTE ESTERASE UR-ACNC: NEGATIVE — SIGNIFICANT CHANGE UP
LYMPHOCYTES # BLD AUTO: 1.9 K/UL — SIGNIFICANT CHANGE UP (ref 1–3.3)
LYMPHOCYTES # BLD AUTO: 32.8 % — SIGNIFICANT CHANGE UP (ref 13–44)
MCHC RBC-ENTMCNC: 29.2 PG — SIGNIFICANT CHANGE UP (ref 27–34)
MCHC RBC-ENTMCNC: 32.1 GM/DL — SIGNIFICANT CHANGE UP (ref 32–36)
MCV RBC AUTO: 90.8 FL — SIGNIFICANT CHANGE UP (ref 80–100)
MONOCYTES # BLD AUTO: 0.4 K/UL — SIGNIFICANT CHANGE UP (ref 0–0.9)
MONOCYTES NFR BLD AUTO: 6 % — SIGNIFICANT CHANGE UP (ref 2–14)
NEUTROPHILS # BLD AUTO: 3.5 K/UL — SIGNIFICANT CHANGE UP (ref 1.8–7.4)
NEUTROPHILS NFR BLD AUTO: 59.5 % — SIGNIFICANT CHANGE UP (ref 43–77)
NITRITE UR-MCNC: NEGATIVE — SIGNIFICANT CHANGE UP
PH UR: 6 — SIGNIFICANT CHANGE UP (ref 5–8)
PLATELET # BLD AUTO: 206 K/UL — SIGNIFICANT CHANGE UP (ref 150–400)
POTASSIUM SERPL-MCNC: 3.7 MMOL/L — SIGNIFICANT CHANGE UP (ref 3.5–5.3)
POTASSIUM SERPL-SCNC: 3.7 MMOL/L — SIGNIFICANT CHANGE UP (ref 3.5–5.3)
PROT SERPL-MCNC: 7.7 G/DL — SIGNIFICANT CHANGE UP (ref 6–8.3)
PROT UR-MCNC: 30 MG/DL
RBC # BLD: 4.47 M/UL — SIGNIFICANT CHANGE UP (ref 3.8–5.2)
RBC # FLD: 12.5 % — SIGNIFICANT CHANGE UP (ref 10.3–14.5)
SODIUM SERPL-SCNC: 138 MMOL/L — SIGNIFICANT CHANGE UP (ref 135–145)
SP GR SPEC: 1.01 — SIGNIFICANT CHANGE UP (ref 1.01–1.02)
UROBILINOGEN FLD QL: NEGATIVE — SIGNIFICANT CHANGE UP
WBC # BLD: 5.9 K/UL — SIGNIFICANT CHANGE UP (ref 3.8–10.5)
WBC # FLD AUTO: 5.9 K/UL — SIGNIFICANT CHANGE UP (ref 3.8–10.5)

## 2018-01-04 PROCEDURE — 96374 THER/PROPH/DIAG INJ IV PUSH: CPT | Mod: XU

## 2018-01-04 PROCEDURE — 74177 CT ABD & PELVIS W/CONTRAST: CPT | Mod: 26

## 2018-01-04 PROCEDURE — 99284 EMERGENCY DEPT VISIT MOD MDM: CPT | Mod: 25

## 2018-01-04 PROCEDURE — 74177 CT ABD & PELVIS W/CONTRAST: CPT

## 2018-01-04 PROCEDURE — 96372 THER/PROPH/DIAG INJ SC/IM: CPT | Mod: XU

## 2018-01-04 PROCEDURE — 99284 EMERGENCY DEPT VISIT MOD MDM: CPT

## 2018-01-04 PROCEDURE — 81001 URINALYSIS AUTO W/SCOPE: CPT

## 2018-01-04 PROCEDURE — 85027 COMPLETE CBC AUTOMATED: CPT

## 2018-01-04 PROCEDURE — 80053 COMPREHEN METABOLIC PANEL: CPT

## 2018-01-04 PROCEDURE — 81025 URINE PREGNANCY TEST: CPT

## 2018-01-04 RX ORDER — ONDANSETRON 8 MG/1
4 TABLET, FILM COATED ORAL ONCE
Qty: 0 | Refills: 0 | Status: COMPLETED | OUTPATIENT
Start: 2018-01-04 | End: 2018-01-04

## 2018-01-04 RX ORDER — ONDANSETRON 8 MG/1
1 TABLET, FILM COATED ORAL
Qty: 15 | Refills: 0 | OUTPATIENT
Start: 2018-01-04

## 2018-01-04 RX ADMIN — Medication 10 MILLIGRAM(S): at 14:17

## 2018-01-04 RX ADMIN — ONDANSETRON 4 MILLIGRAM(S): 8 TABLET, FILM COATED ORAL at 14:16

## 2018-01-04 NOTE — ED ADULT NURSE NOTE - OBJECTIVE STATEMENT
Patient came to the ED a/o x 3 ambulates c/o mid abdominal pain with nausea/ vomiting.  Pt stated "I could not bring any food down when I eat". Abdomen is soft to touch non tender.

## 2018-01-04 NOTE — ED PROVIDER NOTE - MEDICAL DECISION MAKING DETAILS
no guarding so unlikely any acute pathology but patient has not has work up for one year because shes away for college, will CT now because pt is stating this is post op complications, thought Post op complication unlikely given no guarding and such prolonged syx with no signs of cachexia or lightheadedness or orthostasis.  Given pt has such prolonged syx will give bentyl for pain relief and instruct patient that she needs to follow up with GI and her surgeon despite likely improvement in her syx from this medication  Pts father requesting multiple times for "full work up" because of ongoing syx x 1 year

## 2018-01-04 NOTE — ED PROVIDER NOTE - OBJECTIVE STATEMENT
21 yo female presents with nausea, vomiting and diarrhea and abdominal discomfort.  Pt states that she had an appendectomy 1 years ago at [facility] then she always gets bloating and nausea.  SHe is not able to eat much more than rice daily and cannot eat spicy food at all SHe states that for the last 3 weeks she has increasing diarrhea , nausea and bloating.  She denies  Pt without prior surgeries, no etoh or NSDAID overuse. Pt denies dysuria, hematuria, change in urinary frequency/urgency, flank pain.

## 2019-02-27 NOTE — PATIENT PROFILE ADULT. - NS PRO PT RIGHT SUPPORT PERSON
Dhaval Torres MD at 2/27/2019  4:08 PM     Status: Sign at close encounter      Procedure: ORIF L olecranon CPT 01226  Patient's preferred date: this week  Duration of Procedure: 2 hrs  Hospital: [facility]  Anesthesia: Regional  Length of Stay: Outpatient Surgery  Equipment: Synthes olecranon plates, mini-C  Physician Assistant First Assist: No     Please Coordinate:   - Preoperative done by: surgeon        Preoperative Testing Needed:  none  - First postoperative visit: 3-5 days PO        Consent: To be signed at hospital  Postoperative Anticoagulation Treatment: None            
Surgery scheduled on 3/1/19 at 0730, at [facility] w/Dr. Dhaval Torres.  Confirmed #[id number].    Post-op appt scheduled with Dr. Torres at [facility] on 3/6/19 at 1430.    Special equipment listed in case .    -------------------------------------------------------------------------------    ORIF left olecranon - confirmed with patient.  Patient notified of surgery date/time - advised to arrive 1.5 hours early.      Surgery Instructions:   (pt advised the following)    Beginning today & up until  your appointment DO NOT TAKE ASPIRIN OR ASPIRIN CONTAINING PRODUCTS. (Only Tylenol is aspirin free). If you take coumadin or other blood thinners contact your primary care physician..  ASA letter mailed.    Do not eat or drink after midnight the night before your surgery.     [facility] - ins verified.  Auth started.  Pt advised to contact the auth dept for all ins questions.    Patient verbalized understanding all instructions.  No confirmation letter mailed.        
same name as above

## 2021-06-23 NOTE — PRE-OP CHECKLIST - ALLERGIES REVIEWED
The history is provided by the patient. Cough  This is a chronic problem. The current episode started more than 1 week ago. The problem occurs every few minutes. The problem has been gradually worsening. The cough is productive of sputum. Patient reports a subjective fever - was not measured. The fever has been present for less than 1 day. Associated symptoms include chest pain (with cough). Pertinent negatives include no chills, no sweats, no weight loss, no eye redness, no ear congestion, no ear pain, no headaches, no rhinorrhea, no sore throat, no myalgias, no shortness of breath, no wheezing, no nausea, no vomiting and no confusion. She has tried antibiotics and inhalers for the symptoms. The treatment provided no relief. She is not a smoker. Her past medical history is significant for bronchitis. Her past medical history does not include pneumonia, bronchiectasis, asthma, cancer or heart failure. No past medical history on file. No past surgical history on file. No family history on file. Social History     Socioeconomic History    Marital status:      Spouse name: Not on file    Number of children: Not on file    Years of education: Not on file    Highest education level: Not on file   Occupational History    Not on file   Tobacco Use    Smoking status: Never Smoker    Smokeless tobacco: Never Used   Substance and Sexual Activity    Alcohol use: No    Drug use: Yes     Types: Cocaine     Comment: \"use speed every now and then\"    Sexual activity: Not on file   Other Topics Concern    Not on file   Social History Narrative    Not on file     Social Determinants of Health     Financial Resource Strain:     Difficulty of Paying Living Expenses:    Food Insecurity:     Worried About Running Out of Food in the Last Year:     [address] in the Last Year:    Transportation Needs:     Lack of Transportation (Medical):      Lack of Transportation (Non-Medical):    Physical Activity:     Days of Exercise per Week:     Minutes of Exercise per Session:    Stress:     Feeling of Stress :    Social Connections:     Frequency of Communication with Friends and Family:     Frequency of Social Gatherings with Friends and Family:     Attends Religion Services:     Active Member of Clubs or Organizations:     Attends Club or Organization Meetings:     Marital Status:    Intimate Partner Violence:     Fear of Current or Ex-Partner:     Emotionally Abused:     Physically Abused:     Sexually Abused: ALLERGIES: Penicillins    Review of Systems   Constitutional: Negative for chills and weight loss. HENT: Negative for ear pain, rhinorrhea and sore throat. Eyes: Negative for redness. Respiratory: Positive for cough. Negative for shortness of breath and wheezing. Cardiovascular: Positive for chest pain (with cough). Gastrointestinal: Negative for nausea and vomiting. Musculoskeletal: Negative for myalgias. Neurological: Negative for headaches. Psychiatric/Behavioral: Negative for confusion. All other systems reviewed and are negative. Vitals:    06/23/21 0940 06/23/21 1001   BP: 124/63 128/60   Pulse: 98 83   Resp: 16    Temp: 98 °F (36.7 °C)    SpO2: 99% 100%   Weight: 68 kg (150 lb)    Height: 5' 7\" (1.702 m)             Physical Exam  Vitals and nursing note reviewed. Constitutional:       General: She is not in acute distress. Appearance: She is well-developed. HENT:      Head: Normocephalic and atraumatic. Right Ear: External ear normal.      Left Ear: External ear normal.   Eyes:      Extraocular Movements: Extraocular movements intact. Conjunctiva/sclera: Conjunctivae normal.      Pupils: Pupils are equal, round, and reactive to light. Cardiovascular:      Rate and Rhythm: Normal rate and regular rhythm. Heart sounds: Normal heart sounds. No murmur heard.      Pulmonary:      Effort: Pulmonary effort is normal.      Breath sounds: Normal breath sounds. No wheezing, rhonchi or rales. Abdominal:      General: Bowel sounds are normal.      Palpations: Abdomen is soft. Tenderness: There is no abdominal tenderness. There is no right CVA tenderness or left CVA tenderness. Musculoskeletal:         General: No tenderness (Specifically, no calf tenderness. ). Normal range of motion. Cervical back: Normal range of motion and neck supple. Right lower leg: No edema. Left lower leg: No edema. Skin:     General: Skin is warm and dry. Capillary Refill: Capillary refill takes less than 2 seconds. Neurological:      Mental Status: She is alert and oriented to person, place, and time. Psychiatric:         Mood and Affect: Mood normal.         Behavior: Behavior normal.          MDM  Number of Diagnoses or Management Options  Chronic bronchitis, unspecified chronic bronchitis type ([facility] [address]): new and requires workup     Amount and/or Complexity of Data Reviewed  Tests in the radiology section of CPT®: ordered and reviewed  Review and summarize past medical records: yes    Risk of Complications, Morbidity, and/or Mortality  Presenting problems: moderate  Diagnostic procedures: moderate  Management options: moderate    Patient Progress  Patient progress: stable         Procedures      Results Reviewed:  XR CHEST PA LAT   Final Result      No evidence of acute cardiopulmonary disease. VOICE DICTATED BY: Dr. Leno Weaver I discussed the results of all labs, procedures, radiographs, and treatments with the patient and available family. Treatment plan is agreed upon and the patient is ready for discharge. All voiced understanding of the discharge plan and medication instructions or changes as appropriate. Questions about treatment in the ED were answered. All were encouraged to return should symptoms worsen or new problems develop. done

## 2025-03-18 NOTE — ED ADULT TRIAGE NOTE - CHIEF COMPLAINT QUOTE
Detail Level: Generalized
Mid and upper abd pain x 2 weeks. Also nausea and diarrhea.
Detail Level: Simple
Detail Level: Zone
Nicotinamide Supplementation Recommendations: 500 mg twice a day